# Patient Record
Sex: MALE | Race: WHITE | NOT HISPANIC OR LATINO | Employment: OTHER | URBAN - METROPOLITAN AREA
[De-identification: names, ages, dates, MRNs, and addresses within clinical notes are randomized per-mention and may not be internally consistent; named-entity substitution may affect disease eponyms.]

---

## 2017-02-16 ENCOUNTER — HOSPITAL ENCOUNTER (OUTPATIENT)
Dept: SLEEP MEDICINE | Facility: HOSPITAL | Age: 62
Discharge: HOME | End: 2017-02-16
Attending: INTERNAL MEDICINE | Admitting: INTERNAL MEDICINE

## 2017-02-16 PROCEDURE — G0463 HOSPITAL OUTPT CLINIC VISIT: HCPCS

## 2017-02-16 PROCEDURE — 99213 OFFICE O/P EST LOW 20 MIN: CPT | Performed by: INTERNAL MEDICINE

## 2017-02-19 PROBLEM — Z99.89 OSA ON CPAP: Status: ACTIVE | Noted: 2017-02-19

## 2017-02-19 PROBLEM — G47.33 OSA ON CPAP: Status: ACTIVE | Noted: 2017-02-19

## 2017-02-19 NOTE — PROGRESS NOTES
[facility] Note 2/16/2017      Patient Care Team:  Angle Tucker MD as PCP - General  Angle Tucker MD as PCP - Family Medicine    Chief Complaint:  OTONIEL     Interval History:   The patient was last seen by me in June 2016.  He states he is always tired during the day and becomes exhausted by 7 PM.  He goes to bed between 9:30 and 10 PM and awakens at 5:30 AM.  He awakens once for the bathroom.  [place] Sleepiness Scale is abnormal at 9.    Review of Systems:  Recorded on the Sleep Questionnaire.  Unremarkable except for anxiety.    Social History:  He does not smoke cigarettes.  No alcohol.  No caffeine.    Allergies:  No known medical allergies.     Medication Review:  Reviewed.  He takes Nuvigil 250 mg one half tablet every morning.    Vital Signs:  Height 72 inches and weight 205 and he is overweight with a body mass index of 27-28.    Physical Exam:    Constitutional:  Well developed white male and appears in no apparent distress.  Awake & oriented times 3.  Normal mood with normal recent and remote memory and normal judgement.  Eyes:  Conjunctivae normal.  Oropharynx:  moist mucous membranes without exudate and a large tongue and normal uvula and patent posterior pharyngeal opening and class II MP airway.      Results Review:  DME is [employer] and he uses nasal pillows.  Downloads between 8/20/2016 and 2/15/2017 reveals compliance of only 41%.  Average usage is 5 hours and 41 minutes and average AHI is normal without significant leak.  Average auto CPAP pressure is 9.7 and his auto CPAP is 7-15.       Impression:   Obstructive sleep apnea adequately treated with auto titrating CPAP for compliance and good usage.  He has persistent complaints of hypersomnolence and he takes Nuvigil 250 mg one half or one tab every morning.      Plan:  Good sleep hygiene measures should be maintained and some weight loss may be beneficial.  The patient's worsening hypersomnolence is due to his poor  compliance.  Again, I warned him about taking Nuvigil without adequately treating his OTONIEL.  His hypersomnolence could be improved if his compliance with his CPAP was better.  I encouraged increased CPAP use every night.  I also reviewed the website for [employer].  I will see the patient back in 3-4 months.      Manav Méndez MD  02/19/17  11:02 AM

## 2017-05-22 ENCOUNTER — OFFICE (OUTPATIENT)
Dept: URBAN - METROPOLITAN AREA OTHER 6 | Facility: OTHER | Age: 62
End: 2017-05-22

## 2017-05-22 VITALS
HEIGHT: 72 IN | SYSTOLIC BLOOD PRESSURE: 142 MMHG | WEIGHT: 213 LBS | HEART RATE: 82 BPM | DIASTOLIC BLOOD PRESSURE: 72 MMHG

## 2017-05-22 DIAGNOSIS — R07.89 OTHER CHEST PAIN: ICD-10-CM

## 2017-05-22 DIAGNOSIS — K22.4 DYSKINESIA OF ESOPHAGUS: ICD-10-CM

## 2017-05-22 PROCEDURE — 99213 OFFICE O/P EST LOW 20 MIN: CPT | Performed by: INTERNAL MEDICINE

## 2017-05-25 ENCOUNTER — AMBULATORY SURGICAL CENTER (OUTPATIENT)
Dept: URBAN - METROPOLITAN AREA SURGERY 20 | Facility: SURGERY | Age: 62
End: 2017-05-25

## 2017-05-25 ENCOUNTER — OFFICE (OUTPATIENT)
Dept: URBAN - METROPOLITAN AREA CLINIC 64 | Facility: CLINIC | Age: 62
End: 2017-05-25
Payer: COMMERCIAL

## 2017-05-25 DIAGNOSIS — K22.8 OTHER SPECIFIED DISEASES OF ESOPHAGUS: ICD-10-CM

## 2017-05-25 DIAGNOSIS — K29.70 GASTRITIS, UNSPECIFIED, WITHOUT BLEEDING: ICD-10-CM

## 2017-05-25 DIAGNOSIS — K29.50 UNSPECIFIED CHRONIC GASTRITIS WITHOUT BLEEDING: ICD-10-CM

## 2017-05-25 DIAGNOSIS — R07.89 OTHER CHEST PAIN: ICD-10-CM

## 2017-05-25 DIAGNOSIS — K21.0 GASTRO-ESOPHAGEAL REFLUX DISEASE WITH ESOPHAGITIS: ICD-10-CM

## 2017-05-25 PROCEDURE — 88305 TISSUE EXAM BY PATHOLOGIST: CPT | Performed by: INTERNAL MEDICINE

## 2017-05-25 PROCEDURE — 43239 EGD BIOPSY SINGLE/MULTIPLE: CPT | Performed by: INTERNAL MEDICINE

## 2017-05-26 PROBLEM — K22.8 OTHER SPECIFIED DISEASES OF ESOPHAGUS: Status: ACTIVE | Noted: 2017-05-26

## 2017-05-31 ENCOUNTER — APPOINTMENT (OUTPATIENT)
Dept: SLEEP MEDICINE | Facility: HOSPITAL | Age: 62
End: 2017-05-31

## 2017-07-26 ENCOUNTER — APPOINTMENT (OUTPATIENT)
Dept: SLEEP MEDICINE | Facility: HOSPITAL | Age: 62
End: 2017-07-26

## 2017-08-10 ENCOUNTER — OFFICE VISIT (OUTPATIENT)
Dept: CARDIOLOGY | Facility: CLINIC | Age: 62
End: 2017-08-10

## 2017-08-10 VITALS
HEART RATE: 83 BPM | HEIGHT: 72 IN | BODY MASS INDEX: 28.99 KG/M2 | SYSTOLIC BLOOD PRESSURE: 138 MMHG | DIASTOLIC BLOOD PRESSURE: 80 MMHG | WEIGHT: 214 LBS

## 2017-08-10 DIAGNOSIS — I51.7 LEFT VENTRICULAR HYPERTROPHY: ICD-10-CM

## 2017-08-10 DIAGNOSIS — R94.31 ABNORMAL EKG: ICD-10-CM

## 2017-08-10 DIAGNOSIS — I51.7 LVH (LEFT VENTRICULAR HYPERTROPHY): ICD-10-CM

## 2017-08-10 DIAGNOSIS — Z99.89 OSA ON CPAP: Primary | ICD-10-CM

## 2017-08-10 DIAGNOSIS — R09.89 BRUIT OF RIGHT CAROTID ARTERY: ICD-10-CM

## 2017-08-10 DIAGNOSIS — G47.33 OSA ON CPAP: Primary | ICD-10-CM

## 2017-08-10 DIAGNOSIS — R07.2 PRECORDIAL PAIN: ICD-10-CM

## 2017-08-10 PROCEDURE — 99204 OFFICE O/P NEW MOD 45 MIN: CPT | Performed by: INTERNAL MEDICINE

## 2017-08-10 PROCEDURE — 93000 ELECTROCARDIOGRAM COMPLETE: CPT | Performed by: INTERNAL MEDICINE

## 2017-08-10 NOTE — PROGRESS NOTES
Date of Office Visit: 08/10/2017  Encounter Provider: Shirlene Almeida MD  Place of Service: [facility] CARDIOLOGY  Patient Name: Juan Antonio Amezquita  :1955    Chief complaint  Consult requested by Dr. Turk for evaluation of chest pain.    History of Present Illness  Patient is a pleasant 62-year-old gentleman with history of sleep apnea and had been seen in  for atypical chest pain.  A stress echocardiogram revealed normal systolic function with borderline left ventricular hypertrophy and no ischemia.  He was subsequently found to have sleep apnea for which she was placed on CPAP therapy.  He states that over the past 6 months to year as having intermittent episodes of right sided chest discomfort that occurs with stress or when he is rushed.  Last less than 5 minutes and subsequently resolves it is not radiating.  In May 2017 he had a more severe episode of midsternal chest discomfort that occurred at 9 PM while watching TV it lasted for an hour it was unrelieved with nitroglycerin.  Evaluation in the emergency room including CT angiogram of his chest was unremarkable.  There is some mention of an elevated right hemidiaphragm though again this was not noted on the CT scan.  Pain has not recurred since then.  He states he is fairly active is a  and walks frequently up to 10,000 steps a day.    Past Medical History:   Diagnosis Date   • Abdominal pain    • Anal fissure    • Anxiety    • Chest pain    • Fatigue    • Fluid retention    • Hemorrhoids     resolved 14   • Hypogonadism, testicular    • Intercostal pain    • Left ventricular hypertrophy    • Low testosterone    • OTONIEL (obstructive sleep apnea)    • SOB (shortness of breath)     mild     Past Surgical History:   Procedure Laterality Date   • APPENDECTOMY       Outpatient Medications Prior to Visit   Medication Sig Dispense Refill   • ibuprofen (ADVIL,MOTRIN) 800 MG tablet Take  by mouth.     •  pantoprazole (PROTONIX) 40 MG EC tablet Take 40 mg by mouth Daily.     • Testosterone (ANDROGEL PUMP) 20.25 MG/ACT (1.62%) gel Place  on the skin.     • Multiple Vitamins-Minerals (MULTIVITAMIN MEN PO) Take  by mouth.       No facility-administered medications prior to visit.      Allergies as of 08/10/2017   • (No Known Allergies)     Social History     Social History   • Marital status:      Spouse name: N/A   • Number of children: N/A   • Years of education: N/A     Occupational History   • Not on file.     Social History Main Topics   • Smoking status: Never Smoker   • Smokeless tobacco: Not on file   • Alcohol use No   • Drug use: No   • Sexual activity: Not on file     Other Topics Concern   • Not on file     Social History Narrative     Family History   Problem Relation Age of Onset   • Brain cancer Father    • Melanoma Brother    • Hypertension Mother      Review of Systems   Constitution: Negative for fever, malaise/fatigue, weight gain and weight loss.   HENT: Negative for ear pain, hearing loss, nosebleeds and sore throat.    Eyes: Negative for double vision, pain, vision loss in left eye and vision loss in right eye.   Cardiovascular:        See history of present illness.   Respiratory: Negative for cough, shortness of breath, sleep disturbances due to breathing, snoring and wheezing.    Endocrine: Negative for cold intolerance, heat intolerance and polyuria.   Skin: Negative for itching, poor wound healing and rash.   Musculoskeletal: Positive for joint pain. Negative for joint swelling and myalgias.   Gastrointestinal: Positive for abdominal pain. Negative for diarrhea, hematochezia, nausea and vomiting.   Genitourinary: Negative for hematuria and hesitancy.   Neurological: Negative for numbness, paresthesias and seizures.   Psychiatric/Behavioral: Negative for depression. The patient is not nervous/anxious.      Objective:     Vitals:    08/10/17 0848 08/10/17 0900   BP: 142/80 138/80   BP  "Location: Right arm Left arm   Pulse: 83    Weight: 214 lb (97.1 kg)    Height: 72\" (182.9 cm)      Body mass index is 29.02 kg/(m^2).    Physical Exam   Constitutional: He is oriented to person, place, and time. He appears well-developed and well-nourished.   HENT:   Head: Normocephalic.   Nose: Nose normal.   Mouth/Throat: Oropharynx is clear and moist.   Eyes: Conjunctivae and EOM are normal. Pupils are equal, round, and reactive to light. Right eye exhibits no discharge. No scleral icterus.   Neck: Normal range of motion. Neck supple. No JVD present. No thyromegaly present.   Cardiovascular: Normal rate, regular rhythm, normal heart sounds and intact distal pulses.  Exam reveals no gallop and no friction rub.    No murmur heard.  Pulses:       Carotid pulses are 2+ on the right side with bruit, and 2+ on the left side.       Radial pulses are 2+ on the right side, and 2+ on the left side.        Femoral pulses are 2+ on the right side, and 2+ on the left side.       Popliteal pulses are 2+ on the right side, and 2+ on the left side.        Dorsalis pedis pulses are 2+ on the right side, and 2+ on the left side.        Posterior tibial pulses are 2+ on the right side, and 2+ on the left side.   Pulmonary/Chest: Effort normal and breath sounds normal. No respiratory distress. He has no wheezes. He has no rales.   Abdominal: Soft. Bowel sounds are normal. He exhibits no distension. There is no hepatosplenomegaly. There is no tenderness. There is no rebound.   Musculoskeletal: Normal range of motion. He exhibits no edema or tenderness.   Neurological: He is alert and oriented to person, place, and time.   Skin: Skin is warm and dry. No rash noted. No erythema.   Psychiatric: He has a normal mood and affect. His behavior is normal. Judgment and thought content normal.   Vitals reviewed.    Lab Review:     ECG 12 Lead  Date/Time: 8/10/2017 9:41 AM  Performed by: JUSTICE CISNEROS  Authorized by: JUSTICE CISNEROS   Comparison: " compared with previous ECG   Similar to previous ECG  Rhythm: sinus rhythm  Conduction comments: Nonspecific ST T wave changes  QTc = 428 msec  Clinical impression: abnormal ECG          Assessment:       Diagnosis Plan   1. OTONIEL on autoCPAP     2. Left ventricular hypertrophy     3. Abnormal EKG  Adult Stress Echo With Color & Doppler    ECG 12 Lead   4. LVH (left ventricular hypertrophy)  Adult Stress Echo With Color & Doppler    ECG 12 Lead   5. Bruit of right carotid artery  Duplex Carotid Ultrasound CAR   6. Precordial pain  ECG 12 Lead     Plan:       1.  Chest pain.  He has 2 types of chest pain both with anginal and atypical features.  We'll check a stress echocardiogram to assess for ischemia.  2.  Elevated blood pressure with history of left ventricular hypertrophy.  We'll assess by echocardiography  3.  Obstructive sleep apnea on CPAP therapy  4.  History of borderline elevated elevated glucose, low carbohydrate diet and increase in his excise regimen recommended  5.  Dyslipidemia  6.  Abdominal pain is chronic intermittent abdominal pain with a colonoscopy that was negative a year ago.  He has no melena or bright red blood.  7.  Carotid bruit. Will check a carotid doppler    Juan Antonio Amezquita   Home Medication Instructions YADIRA:    Printed on:08/13/17 2004   Medication Information                      ibuprofen (ADVIL,MOTRIN) 800 MG tablet  Take  by mouth.             pantoprazole (PROTONIX) 40 MG EC tablet  Take 40 mg by mouth Daily.             Testosterone (ANDROGEL PUMP) 20.25 MG/ACT (1.62%) gel  Place  on the skin.               Dictated utilizing Dragon dictation

## 2017-08-13 PROBLEM — R09.89 BRUIT OF RIGHT CAROTID ARTERY: Status: ACTIVE | Noted: 2017-08-13

## 2017-08-13 PROBLEM — I51.7 LVH (LEFT VENTRICULAR HYPERTROPHY): Status: ACTIVE | Noted: 2017-08-13

## 2017-08-13 PROBLEM — R94.31 ABNORMAL EKG: Status: ACTIVE | Noted: 2017-08-13

## 2017-08-13 PROBLEM — R07.2 PRECORDIAL PAIN: Status: ACTIVE | Noted: 2017-08-13

## 2017-08-18 ENCOUNTER — HOSPITAL ENCOUNTER (OUTPATIENT)
Dept: CARDIOLOGY | Facility: HOSPITAL | Age: 62
Discharge: HOME | End: 2017-08-18
Attending: INTERNAL MEDICINE | Admitting: INTERNAL MEDICINE

## 2017-08-18 VITALS
DIASTOLIC BLOOD PRESSURE: 80 MMHG | BODY MASS INDEX: 28.99 KG/M2 | SYSTOLIC BLOOD PRESSURE: 122 MMHG | HEART RATE: 76 BPM | WEIGHT: 214 LBS | HEIGHT: 72 IN | OXYGEN SATURATION: 97 %

## 2017-08-18 DIAGNOSIS — I51.7 LVH (LEFT VENTRICULAR HYPERTROPHY): ICD-10-CM

## 2017-08-18 DIAGNOSIS — R94.31 ABNORMAL EKG: ICD-10-CM

## 2017-08-18 LAB
BH CV ECHO MEAS - ACS: 2.3 CM
BH CV ECHO MEAS - AO MAX PG (FULL): 3.1 MMHG
BH CV ECHO MEAS - AO MAX PG: 6.4 MMHG
BH CV ECHO MEAS - AO ROOT AREA (BSA CORRECTED): 1.4
BH CV ECHO MEAS - AO ROOT AREA: 7.9 CM^2
BH CV ECHO MEAS - AO ROOT DIAM: 3.2 CM
BH CV ECHO MEAS - AO V2 MAX: 126.2 CM/SEC
BH CV ECHO MEAS - BSA(HAYCOCK): 2.2 M^2
BH CV ECHO MEAS - BSA: 2.2 M^2
BH CV ECHO MEAS - BZI_BMI: 29 KILOGRAMS/M^2
BH CV ECHO MEAS - BZI_METRIC_HEIGHT: 182.9 CM
BH CV ECHO MEAS - BZI_METRIC_WEIGHT: 97.1 KG
BH CV ECHO MEAS - CONTRAST EF 4CH: 66.7 ML/M^2
BH CV ECHO MEAS - EDV(MOD-SP4): 78 ML
BH CV ECHO MEAS - EDV(TEICH): 124.3 ML
BH CV ECHO MEAS - EF(CUBED): 82.1 %
BH CV ECHO MEAS - EF(MOD-SP4): 66.7 %
BH CV ECHO MEAS - EF(TEICH): 74.5 %
BH CV ECHO MEAS - ESV(MOD-SP4): 26 ML
BH CV ECHO MEAS - ESV(TEICH): 31.7 ML
BH CV ECHO MEAS - FS: 43.6 %
BH CV ECHO MEAS - IVS/LVPW: 1
BH CV ECHO MEAS - IVSD: 0.88 CM
BH CV ECHO MEAS - LAT PEAK E' VEL: 8 CM/SEC
BH CV ECHO MEAS - LV DIASTOLIC VOL/BSA (35-75): 35.6 ML/M^2
BH CV ECHO MEAS - LV MASS(C)D: 155.6 GRAMS
BH CV ECHO MEAS - LV MASS(C)DI: 71 GRAMS/M^2
BH CV ECHO MEAS - LV MAX PG: 3.3 MMHG
BH CV ECHO MEAS - LV MEAN PG: 1.5 MMHG
BH CV ECHO MEAS - LV SYSTOLIC VOL/BSA (12-30): 11.9 ML/M^2
BH CV ECHO MEAS - LV V1 MAX: 90.2 CM/SEC
BH CV ECHO MEAS - LV V1 MEAN: 55.4 CM/SEC
BH CV ECHO MEAS - LV V1 VTI: 19.3 CM
BH CV ECHO MEAS - LVIDD: 5.1 CM
BH CV ECHO MEAS - LVIDS: 2.9 CM
BH CV ECHO MEAS - LVLD AP4: 6.9 CM
BH CV ECHO MEAS - LVLS AP4: 6.1 CM
BH CV ECHO MEAS - LVPWD: 0.85 CM
BH CV ECHO MEAS - MED PEAK E' VEL: 8 CM/SEC
BH CV ECHO MEAS - MV A DUR: 0.11 SEC
BH CV ECHO MEAS - MV A MAX VEL: 60.6 CM/SEC
BH CV ECHO MEAS - MV DEC SLOPE: 263.5 CM/SEC^2
BH CV ECHO MEAS - MV DEC TIME: 0.23 SEC
BH CV ECHO MEAS - MV E MAX VEL: 63.5 CM/SEC
BH CV ECHO MEAS - MV E/A: 1
BH CV ECHO MEAS - MV P1/2T MAX VEL: 65 CM/SEC
BH CV ECHO MEAS - MV P1/2T: 72.2 MSEC
BH CV ECHO MEAS - MVA P1/2T LCG: 3.4 CM^2
BH CV ECHO MEAS - MVA(P1/2T): 3 CM^2
BH CV ECHO MEAS - PULM A REVS DUR: 0.1 SEC
BH CV ECHO MEAS - PULM A REVS VEL: 35.4 CM/SEC
BH CV ECHO MEAS - PULM DIAS VEL: 42.7 CM/SEC
BH CV ECHO MEAS - PULM S/D: 1.3
BH CV ECHO MEAS - PULM SYS VEL: 53.4 CM/SEC
BH CV ECHO MEAS - RAP SYSTOLE: 3 MMHG
BH CV ECHO MEAS - SI(CUBED): 49.9 ML/M^2
BH CV ECHO MEAS - SI(MOD-SP4): 23.7 ML/M^2
BH CV ECHO MEAS - SI(TEICH): 42.2 ML/M^2
BH CV ECHO MEAS - SV(CUBED): 109.4 ML
BH CV ECHO MEAS - SV(MOD-SP4): 52 ML
BH CV ECHO MEAS - SV(TEICH): 92.6 ML
BH CV STRESS BP STAGE 1: NORMAL
BH CV STRESS BP STAGE 2: NORMAL
BH CV STRESS BP STAGE 3: NORMAL
BH CV STRESS DURATION MIN STAGE 1: 3
BH CV STRESS DURATION MIN STAGE 2: 3
BH CV STRESS DURATION MIN STAGE 3: 2
BH CV STRESS DURATION SEC STAGE 1: 0
BH CV STRESS DURATION SEC STAGE 2: 0
BH CV STRESS DURATION SEC STAGE 3: 30
BH CV STRESS ECHO POST STRESS EJECTION FRACTION EF: 74 %
BH CV STRESS GRADE STAGE 1: 10
BH CV STRESS GRADE STAGE 2: 12
BH CV STRESS GRADE STAGE 3: 14
BH CV STRESS HR STAGE 1: 102
BH CV STRESS HR STAGE 2: 124
BH CV STRESS HR STAGE 3: 145
BH CV STRESS METS STAGE 1: 5
BH CV STRESS METS STAGE 2: 7.5
BH CV STRESS METS STAGE 3: 10
BH CV STRESS PROTOCOL 1: NORMAL
BH CV STRESS SPEED STAGE 1: 1.7
BH CV STRESS SPEED STAGE 2: 2.5
BH CV STRESS SPEED STAGE 3: 3.4
BH CV STRESS STAGE 1: 1
BH CV STRESS STAGE 2: 2
BH CV STRESS STAGE 3: 3
E/E' RATIO: 8
LEFT ATRIUM VOLUME INDEX: 16 ML/M2
LEFT ATRIUM VOLUME: 36 CM3
LV EF 2D ECHO EST: 66 %
MAXIMAL PREDICTED HEART RATE: 157 BPM
PERCENT MAX PREDICTED HR: 92.36 %
STRESS BASELINE BP: NORMAL MMHG
STRESS BASELINE HR: 76 BPM
STRESS PERCENT HR: 109 %
STRESS POST ESTIMATED WORKLOAD: 9 METS
STRESS POST EXERCISE DUR MIN: 8 MIN
STRESS POST EXERCISE DUR SEC: 30 SEC
STRESS POST PEAK BP: NORMAL MMHG
STRESS POST PEAK HR: 145 BPM
STRESS TARGET HR: 133 BPM

## 2017-08-18 PROCEDURE — 25010000002 PERFLUTREN (DEFINITY) 8.476 MG IN SODIUM CHLORIDE 10 ML INJECTION: Performed by: INTERNAL MEDICINE

## 2017-08-18 PROCEDURE — C8928 TTE W OR W/O FOL W/CON,STRES: HCPCS

## 2017-08-18 PROCEDURE — 93018 CV STRESS TEST I&R ONLY: CPT | Performed by: INTERNAL MEDICINE

## 2017-08-18 PROCEDURE — 93350 STRESS TTE ONLY: CPT | Performed by: INTERNAL MEDICINE

## 2017-08-18 PROCEDURE — 93325 DOPPLER ECHO COLOR FLOW MAPG: CPT

## 2017-08-18 PROCEDURE — 93016 CV STRESS TEST SUPVJ ONLY: CPT | Performed by: INTERNAL MEDICINE

## 2017-08-18 PROCEDURE — 93017 CV STRESS TEST TRACING ONLY: CPT

## 2017-08-18 PROCEDURE — 93325 DOPPLER ECHO COLOR FLOW MAPG: CPT | Performed by: INTERNAL MEDICINE

## 2017-08-18 PROCEDURE — 93352 ADMIN ECG CONTRAST AGENT: CPT | Performed by: INTERNAL MEDICINE

## 2017-08-18 PROCEDURE — 93320 DOPPLER ECHO COMPLETE: CPT | Performed by: INTERNAL MEDICINE

## 2017-08-18 PROCEDURE — 93320 DOPPLER ECHO COMPLETE: CPT

## 2017-08-18 RX ADMIN — PERFLUTREN 3 ML: 6.52 INJECTION, SUSPENSION INTRAVENOUS at 09:28

## 2017-08-24 ENCOUNTER — HOSPITAL ENCOUNTER (OUTPATIENT)
Dept: SLEEP MEDICINE | Facility: HOSPITAL | Age: 62
Discharge: HOME | End: 2017-08-24
Admitting: INTERNAL MEDICINE

## 2017-08-24 PROCEDURE — G0463 HOSPITAL OUTPT CLINIC VISIT: HCPCS

## 2017-08-24 PROCEDURE — 99213 OFFICE O/P EST LOW 20 MIN: CPT | Performed by: INTERNAL MEDICINE

## 2017-08-25 ENCOUNTER — TELEPHONE (OUTPATIENT)
Dept: CARDIOLOGY | Facility: CLINIC | Age: 62
End: 2017-08-25

## 2017-08-25 NOTE — TELEPHONE ENCOUNTER
I called patient at cell phone and got voicemail.  Left message to call back.  Left message on cell phone that test okay to call back on Monday. mehul

## 2017-08-26 NOTE — PROGRESS NOTES
[facility] Note       Patient Care Team:  Angle Tucker MD as PCP - Family Medicine  Manav Méndez MD as Consulting Physician (Sleep Medicine)    Chief Complaint:  OTONIEL     Interval History:   The patient was last seen by me in February of this year.  He has persistent complaints of hypersomnolence.  He states he is better presently.  He goes to bed at 10 PM awakens at 5:30 AM.  He awakens once for the bathroom.  His [place] Sleepiness Scale is still abnormal at 12.  The patient stopped Nuvigil since I saw him last time.    Review of Systems:  Recorded on the Sleep Questionnaire.  Unremarkable .    Social History:  He does not smoke cigarettes.  He denies alcohol or caffeine.    Allergies:  No known medical allergies.     Medication Review:  Reviewed.      Vital Signs:  Height 72 inches and weight 205 and he is overweight with a body mass index of 28.    Physical Exam:    Constitutional:  Well developed white male and appears in no apparent distress.  Awake & oriented times 3.  Normal mood with normal recent and remote memory and normal judgement.  Eyes:  Conjunctivae normal.  Oropharynx:  moist mucous membranes without exudate and a large tongue and normal uvula and patent posterior pharyngeal opening and class II MP airway.      Results Review:  DME is  and he uses a nasal mask.  Downloads between February 25 and August 23, 2017 compliances 73% and average usage is 5 hours and 54 minutes and average AHI is normal without leak and average auto CPAP pressure is 9.4 and his auto CPAP is 7-15.  Since June 14, the patient's compliance is greater than 95%.       Impression:   Obstructive sleep apnea adequately treated with auto titrating CPAP with good compliance and usage and some persistent complaints of hypersomnolence.      Plan:  Good sleep hygiene measures should be maintained.  Weight loss would be beneficial in this patient who is obese by BMI.  The patient is benefiting from  the treatment being provided.     The patient will continue auto CPAP as he is doing.  He should use it every night.  A new prescription will be sent to his DME for all needed supplies.    The patient will call for any problems and will follow up in one year.      Manav Méndez MD  08/26/17  1:43 PM

## 2017-08-28 DIAGNOSIS — Z13.6 ENCOUNTER FOR SCREENING FOR VASCULAR DISEASE: ICD-10-CM

## 2017-08-28 DIAGNOSIS — R09.89 CAROTID BRUIT, UNSPECIFIED LATERALITY: Primary | ICD-10-CM

## 2017-08-28 NOTE — TELEPHONE ENCOUNTER
I talked to patient regarding test results.  He did not have typical symptoms on the treadmill.  He had vague: And has not likely musculoskeletal.  He will pay attention to this in the upcoming months.  We'll check a screening vascular study as insurance would not cover carotid Doppler for carotid bruit.     Please arrange a vascular screening.  mehul

## 2017-08-29 NOTE — TELEPHONE ENCOUNTER
We do not have anymore openings for the vascular screening this year, and do not have the schedule for next year yet. Would you like me to inform the patient that they can get it done at the hospital for 75.00?

## 2017-10-09 ENCOUNTER — OFFICE (OUTPATIENT)
Dept: URBAN - METROPOLITAN AREA OTHER 6 | Facility: OTHER | Age: 62
End: 2017-10-09
Payer: COMMERCIAL

## 2017-10-09 VITALS
HEART RATE: 83 BPM | DIASTOLIC BLOOD PRESSURE: 60 MMHG | WEIGHT: 209 LBS | HEIGHT: 72 IN | SYSTOLIC BLOOD PRESSURE: 100 MMHG

## 2017-10-09 DIAGNOSIS — R07.89 OTHER CHEST PAIN: ICD-10-CM

## 2017-10-09 DIAGNOSIS — K22.4 DYSKINESIA OF ESOPHAGUS: ICD-10-CM

## 2017-10-09 DIAGNOSIS — K29.50 UNSPECIFIED CHRONIC GASTRITIS WITHOUT BLEEDING: ICD-10-CM

## 2017-10-09 PROCEDURE — 99212 OFFICE O/P EST SF 10 MIN: CPT | Performed by: INTERNAL MEDICINE

## 2017-11-12 ENCOUNTER — HOSPITAL ENCOUNTER (OUTPATIENT)
Dept: CARDIOLOGY | Facility: HOSPITAL | Age: 62
Discharge: HOME | End: 2017-11-12
Attending: INTERNAL MEDICINE

## 2017-11-12 DIAGNOSIS — Z13.6 ENCOUNTER FOR SCREENING FOR VASCULAR DISEASE: ICD-10-CM

## 2017-11-12 DIAGNOSIS — R09.89 CAROTID BRUIT, UNSPECIFIED LATERALITY: ICD-10-CM

## 2017-11-14 LAB
BH CV XLRA MEAS - PAD LEFT ARM: 121 MMHG
BH CV XLRA MEAS - PAD LEFT LEG PT: 138 MMHG
BH CV XLRA MEAS - PAD RIGHT ARM: 99 MMHG
BH CV XLRA MEAS - PAD RIGHT LEG PT: 151 MMHG
BH CV XLRA MEAS - PROX AO DIAM: 1.7 CM
BH CV XLRA MEAS LEFT ICA/CCA RATIO: 0.8
BH CV XLRA MEAS LEFT MID CCA PSV: NORMAL CM/SEC
BH CV XLRA MEAS LEFT MID ICA PSV: NORMAL CM/SEC
BH CV XLRA MEAS LEFT PROX ECA PSV: 89 CM/SEC
BH CV XLRA MEAS RIGHT ICA/CCA RATIO: 0.77
BH CV XLRA MEAS RIGHT MID CCA PSV: NORMAL CM/SEC
BH CV XLRA MEAS RIGHT MID ICA PSV: NORMAL CM/SEC
BH CV XLRA MEAS RIGHT PROX ECA PSV: 121 CM/SEC

## 2018-03-15 ENCOUNTER — OFFICE VISIT (OUTPATIENT)
Dept: CARDIOLOGY | Facility: CLINIC | Age: 63
End: 2018-03-15

## 2018-03-15 VITALS
WEIGHT: 213 LBS | SYSTOLIC BLOOD PRESSURE: 126 MMHG | BODY MASS INDEX: 28.85 KG/M2 | DIASTOLIC BLOOD PRESSURE: 80 MMHG | HEIGHT: 72 IN | HEART RATE: 83 BPM

## 2018-03-15 DIAGNOSIS — R94.31 ABNORMAL EKG: ICD-10-CM

## 2018-03-15 DIAGNOSIS — I51.7 LEFT VENTRICULAR HYPERTROPHY: ICD-10-CM

## 2018-03-15 DIAGNOSIS — R07.2 PRECORDIAL PAIN: Primary | ICD-10-CM

## 2018-03-15 PROCEDURE — 93000 ELECTROCARDIOGRAM COMPLETE: CPT | Performed by: INTERNAL MEDICINE

## 2018-03-15 PROCEDURE — 99213 OFFICE O/P EST LOW 20 MIN: CPT | Performed by: INTERNAL MEDICINE

## 2018-03-15 NOTE — PROGRESS NOTES
Date of Office Visit: 03/15/2018  Encounter Provider: Shirlene Almeida MD  Place of Service: [facility] CARDIOLOGY  Patient Name: Juan Antonio Amezquita  :1955    Chief complaint  Follow-up of chest pain.    History of Present Illness  Patient is a 62-year-old gentleman with history of sleep apnea and atypical chest pain.  He was seen and 2017 for recurrent chest discomfort in fact had 2 types.  With atypical and anginal features.  He had an echocardiogram that revealed normal systolic function with no significant valvular heart disease.  He had a no ischemia on the stress test.  He was treated further for elevated blood pressure with lifestyle changes and is here for follow-up.  In addition he had a vascular screening study that was normal.    Since last visit he states his blood pressure he believes has improved.  He is walking 10,000 steps a day.  He denies any exertional chest pain palpitations syncope near syncope.  He has had some right parasternal chest discomfort that's positional.  There was a mention on prior records of possible right diaphragmatic paralysis but this was not noted on subsequent imaging studies.    Past Medical History:   Diagnosis Date   • Abdominal pain    • Anal fissure    • Anxiety    • Chest pain    • Fatigue    • Fluid retention    • Hemorrhoids     resolved 14   • Hypogonadism, testicular    • Intercostal pain    • Left ventricular hypertrophy    • Low testosterone    • OTONIEL (obstructive sleep apnea)    • SOB (shortness of breath)     mild     Past Surgical History:   Procedure Laterality Date   • APPENDECTOMY         Current Outpatient Prescriptions:   •  ibuprofen (ADVIL,MOTRIN) 800 MG tablet, Take  by mouth., Disp: , Rfl:   •  Testosterone (ANDROGEL PUMP) 20.25 MG/ACT (1.62%) gel, Place  on the skin., Disp: , Rfl:     (Not in a hospital admission)    Allergies as of 03/15/2018   • (No Known Allergies)     Social History     Social History   •  "Marital status:      Spouse name: N/A   • Number of children: N/A   • Years of education: N/A     Occupational History   • Not on file.     Social History Main Topics   • Smoking status: Never Smoker   • Smokeless tobacco: Not on file   • Alcohol use No   • Drug use: No   • Sexual activity: Not on file     Other Topics Concern   • Not on file     Social History Narrative   • No narrative on file     Family History   Problem Relation Age of Onset   • Brain cancer Father    • Melanoma Brother    • Hypertension Mother      Review of Systems   Constitution: Positive for malaise/fatigue. Negative for fever, weight gain and weight loss.   HENT: Negative for ear pain, hearing loss, nosebleeds and sore throat.    Eyes: Negative for double vision, pain, vision loss in left eye and vision loss in right eye.   Cardiovascular:        See history of present illness.   Respiratory: Negative for cough, shortness of breath, sleep disturbances due to breathing, snoring and wheezing.    Endocrine: Negative for cold intolerance, heat intolerance and polyuria.   Skin: Negative for itching, poor wound healing and rash.   Musculoskeletal: Negative for joint pain, joint swelling and myalgias.   Gastrointestinal: Negative for abdominal pain, diarrhea, hematochezia, nausea and vomiting.   Genitourinary: Negative for hematuria and hesitancy.   Neurological: Negative for numbness, paresthesias and seizures.   Psychiatric/Behavioral: Negative for depression. The patient is nervous/anxious.         Objective:     Vitals:    03/15/18 0825   BP: 126/80   Pulse: 83   Weight: 96.6 kg (213 lb)   Height: 182.9 cm (72\")     Body mass index is 28.89 kg/m².    Physical Exam   Constitutional: He is oriented to person, place, and time. He appears well-developed and well-nourished. No distress.   HENT:   Head: Normocephalic.   Eyes: Conjunctivae are normal. Pupils are equal, round, and reactive to light. No scleral icterus.   Neck: Normal carotid " pulses, no hepatojugular reflux and no JVD present. Carotid bruit is not present. No tracheal deviation, no edema and no erythema present. No thyromegaly present.   Cardiovascular: Normal rate, regular rhythm, S1 normal, S2 normal, normal heart sounds and intact distal pulses.   No extrasystoles are present. PMI is not displaced.  Exam reveals no gallop, no distant heart sounds and no friction rub.    No murmur heard.  Pulses:       Carotid pulses are 2+ on the right side, and 2+ on the left side.       Radial pulses are 2+ on the right side, and 2+ on the left side.        Femoral pulses are 2+ on the right side, and 2+ on the left side.       Dorsalis pedis pulses are 2+ on the right side, and 2+ on the left side.        Posterior tibial pulses are 2+ on the right side, and 2+ on the left side.   Pulmonary/Chest: Effort normal and breath sounds normal. No respiratory distress. He has no decreased breath sounds. He has no wheezes. He has no rhonchi. He has no rales. He exhibits no tenderness.   Abdominal: Soft. Bowel sounds are normal. He exhibits no distension and no mass. There is no hepatosplenomegaly. There is no tenderness. There is no rebound and no guarding.   Musculoskeletal: He exhibits no edema, tenderness or deformity.   Neurological: He is alert and oriented to person, place, and time.   Skin: Skin is warm and dry. No rash noted. He is not diaphoretic. No cyanosis or erythema. No pallor. Nails show no clubbing.   Psychiatric: He has a normal mood and affect. His speech is normal and behavior is normal. Judgment and thought content normal.     Lab Review:     ECG 12 Lead  Date/Time: 3/15/2018 9:12 AM  Performed by: JUSTICE CISNEROS  Authorized by: JUSTICE CISNEROS   Comparison: compared with previous ECG   Similar to previous ECG  Rhythm: sinus rhythm  Clinical impression: normal ECG          Assessment:       Diagnosis Plan   1. Precordial pain  ECG 12 Lead   2. Left ventricular hypertrophy     3. Abnormal EKG        Plan:         1.  Chest pain.  No evidence of ischemia on recent testing.  We'll observe for now infected symptoms of improved.  Residual right-sided chest pain sounds more musculoskeletal  2.  Hypertension improved with medical therapy last all changes.  We'll observe for now  3.  Obstructive sleep apnea, on CPAP therapy  4.  Questionable right diaphragmatic paralysis.  Remains asymptomatic.  We'll also follow-up with primary care physician with whom he is to establish care.     Juan Antonio Amezquita   Home Medication Instructions YADIRA:    Printed on:03/22/18 8594   Medication Information                      ibuprofen (ADVIL,MOTRIN) 800 MG tablet  Take  by mouth.             Testosterone (ANDROGEL PUMP) 20.25 MG/ACT (1.62%) gel  Place  on the skin.                 No orders of the defined types were placed in this encounter.      Dictated utilizing Dragon dictation

## 2018-04-11 ENCOUNTER — OFFICE (OUTPATIENT)
Dept: URBAN - METROPOLITAN AREA OTHER 6 | Facility: OTHER | Age: 63
End: 2018-04-11

## 2018-04-11 VITALS
HEART RATE: 96 BPM | WEIGHT: 213 LBS | SYSTOLIC BLOOD PRESSURE: 140 MMHG | DIASTOLIC BLOOD PRESSURE: 80 MMHG | HEIGHT: 72 IN

## 2018-04-11 DIAGNOSIS — R07.89 OTHER CHEST PAIN: ICD-10-CM

## 2018-04-11 DIAGNOSIS — K22.4 DYSKINESIA OF ESOPHAGUS: ICD-10-CM

## 2018-04-11 PROCEDURE — 99212 OFFICE O/P EST SF 10 MIN: CPT | Performed by: INTERNAL MEDICINE

## 2018-04-11 RX ORDER — AMITRIPTYLINE HYDROCHLORIDE 25 MG/1
25 TABLET, FILM COATED ORAL
Qty: 90 | Refills: 5 | Status: ACTIVE
Start: 2017-06-08

## 2018-04-19 ENCOUNTER — OFFICE VISIT (OUTPATIENT)
Dept: SLEEP MEDICINE | Facility: HOSPITAL | Age: 63
End: 2018-04-19
Attending: INTERNAL MEDICINE

## 2018-04-19 DIAGNOSIS — G47.33 OSA ON CPAP: Primary | ICD-10-CM

## 2018-04-19 DIAGNOSIS — Z99.89 OSA ON CPAP: Primary | ICD-10-CM

## 2018-04-19 DIAGNOSIS — G47.14 HYPERSOMNIA DUE TO MEDICAL CONDITION: ICD-10-CM

## 2018-04-19 PROCEDURE — 99213 OFFICE O/P EST LOW 20 MIN: CPT | Performed by: INTERNAL MEDICINE

## 2018-04-19 PROCEDURE — G0463 HOSPITAL OUTPT CLINIC VISIT: HCPCS

## 2018-04-19 NOTE — PROGRESS NOTES
Follow Up [facility] Note       Patient Care Team:  Angle Tucker MD as PCP - Family Medicine  Christopher Faith MD as Consulting Physician (Gastroenterology)  Manav Méndez MD as Consulting Physician (Sleep Medicine)    Chief Complaint:  OTONIEL     Interval History:   The patient was last seen by me in August 2017.  He is stable and unchanged.  He goes to bed at 9:30 PM and awakens at 5:30 AM.  He awakens once for the bathroom.  [place] Sleepiness Scale is abnormal at 16.    Since I last saw him, he states his gastroenterologist added amitriptyline for his GI tract.    Review of Systems:  Recorded on the Sleep Questionnaire.  Unremarkable .    Social History:  Caffeine minimal  Social History     Social History   • Marital status:      Social History Main Topics   • Smoking status: Never Smoker   • Alcohol use No   • Drug use: No     Other Topics Concern   • Not on file       Allergies:  Review of patient's allergies indicates no known allergies.     Medication Review:  Reviewed.      Vital Signs:  Height 72 inches, weight 205 pounds and BMI overweight at 28.    Physical Exam:    Constitutional:  Well developed white male and appears in no apparent distress.  Awake & oriented times 3.  Normal mood with normal recent and remote memory and normal judgement.  Eyes:  Conjunctivae normal.  Oropharynx:  moist mucous membranes without exudate and a large tongue and normal uvula and patent posterior pharyngeal opening and class II MP airway      Results Review:  DME is  and he uses a nasal mask.  Downloads between October 21 and April 18, 2018 compliances.  Average usage is 5 hours and 52 minutes.  Average AHI is normal without leak.  Average AutoCPAP pressure is 9.1 and his auto CPAP is 7-15.       Impression:   Obstructive sleep apnea adequately treated with auto titrating CPAP with good compliance and usage and some persistent complaints of hypersomnolence      Plan:  Good sleep hygiene  measures should be maintained.  Some weight loss would be beneficial in this patient who is overweight by BMI.  The patient is benefiting from the treatment being provided.     The patient will continue auto CPAP.  His usage has improved since January of this year.  A new prescription will be sent for all needed supplies.    The patient will call for any problems and will follow up in one year.      Manav Méndez MD  Sleep Medicine  04/24/18  9:50 AM

## 2018-04-24 PROBLEM — G47.14 HYPERSOMNIA DUE TO MEDICAL CONDITION: Status: ACTIVE | Noted: 2018-04-24

## 2018-11-01 ENCOUNTER — OFFICE VISIT (OUTPATIENT)
Dept: CARDIOLOGY | Facility: CLINIC | Age: 63
End: 2018-11-01

## 2018-11-01 VITALS
HEIGHT: 72 IN | BODY MASS INDEX: 29.26 KG/M2 | DIASTOLIC BLOOD PRESSURE: 82 MMHG | WEIGHT: 216 LBS | HEART RATE: 86 BPM | SYSTOLIC BLOOD PRESSURE: 128 MMHG

## 2018-11-01 DIAGNOSIS — I51.7 LEFT VENTRICULAR HYPERTROPHY: ICD-10-CM

## 2018-11-01 DIAGNOSIS — Z87.898 HISTORY OF CHEST PAIN: Primary | ICD-10-CM

## 2018-11-01 DIAGNOSIS — G47.33 OSA ON CPAP: ICD-10-CM

## 2018-11-01 DIAGNOSIS — Z99.89 OSA ON CPAP: ICD-10-CM

## 2018-11-01 DIAGNOSIS — R94.31 ABNORMAL EKG: ICD-10-CM

## 2018-11-01 PROCEDURE — 99214 OFFICE O/P EST MOD 30 MIN: CPT | Performed by: NURSE PRACTITIONER

## 2018-11-01 PROCEDURE — 93000 ELECTROCARDIOGRAM COMPLETE: CPT | Performed by: NURSE PRACTITIONER

## 2018-11-01 RX ORDER — RANITIDINE 300 MG/1
1 TABLET ORAL DAILY
COMMUNITY
Start: 2018-10-23

## 2018-11-01 NOTE — PROGRESS NOTES
Date of Office Visit: 2018  Encounter Provider: Karen Miles, CARMELLA, APRN  Place of Service: [facility] CARDIOLOGY  Patient Name: Juan Antonio Amezquita  :1955      Subjective:     Chief Complaint:  Follow-up, history of LVH, history of chest pain, sleep apnea.    History of Present Illness:  Juan Antonio Amezquita is a 63 y.o. male patient of Dr. Almeida.  This is my first time seeing this patient in the office and I reviewed his records.    Patient has a history of hypertension, LVH, chest pain, abnormal EKG, sleep apnea.    Patient was seen in 2017 for recurrent chest discomfort, and it was noted that he had 2 types of chest pain.  One was atypical, one had anginal features.  He had an echocardiogram that revealed normal systolic function with no significant valvular heart disease.  He had a stress test that did not show ischemia.  He was treated further for elevated blood pressure with lifestyle changes.  He also had a vascular study done which showed no significant stenosis of carotid arteries.    Patient was last seen in office by Dr. Almeida 3/22/18.  At this point it was noted that his blood pressure had improved.  He was walking 10,000 steps a day.  He denied any exertional chest pain, palpitations, syncope, near-syncope.  He did have some right parasternal chest discomfort that was positional in nature.  There is mention of prior records of possible right diaphragmatic paralysis but this had not been noted on subsequent imaging studies.  Patient was instructed to follow-up in 6 months or sooner if needed.    Patient presents to office today for follow-up appointment.  A short reports he has been doing well overall since last visit.  He reports that he has some chronic fatigue.  He has a history of sleep apnea and reports that he uses his CPAP machine at least 90% of the time.  He has seen sleep medicine within the last year and reports that he sees him yearly.  He denies any chest  pain, shortness of breath, shortness of breath with exertion, palpitations, racing heartbeat sensation, lower extremity edema, syncope, near-syncope, falls, abnormal bleeding.  Unfortunately patient retired since last visit and reports that he has not been doing his daily walking anymore.  He stays active around the house and denies any exertional symptoms with his routine activities.  He reports that she was under some increased stress due to recently moving (he sold his house to his daughter and persistent had a patio home), and he is getting ready to [place] of [place].  However he is planning on keeping his patio home here in [place] and has grandkids here and travels here frequently so he wants to keep his providers here.  He will schedule a follow-up appointment with Dr. Almeida in 6 months.  He does not check blood pressure regularly outside the office but he reports that he was recently around 120/80 at primary care provider's office.  He does have electronic blood pressure cuff and reports she will start checking it a couple of times a week to make sure that it is staying 130/80 or less.  Patient reports he has been having anxiety recently.  He denies any SI.  We discussed following up with this further with his primary care provider.  I did offer to place a psychiatry referral, however patient will like to discuss this with primary care provider first.  I also discussed with patient that he could call his insurance company to find out the name of some talk therapists in his network.  Patient is going to follow-up with primary care provider for further evaluation of fatigue and anxiety-- he has an appointment scheduled within the next one to 2 weeks.  Patient was also encouraged to get back into walking routine and start out slowly with this and increase as tolerated.  He'll notify the office if he has any issues or problems with exertion.      Past Medical History:   Diagnosis Date   •  Abdominal pain    • Anal fissure    • Anxiety    • Chest pain    • Fatigue    • Fluid retention    • Hemorrhoids     resolved 05/29/14   • Hypogonadism, testicular    • Intercostal pain    • Left ventricular hypertrophy    • Low testosterone    • OTONIEL (obstructive sleep apnea)    • Precordial pain    • SOB (shortness of breath)     mild     Past Surgical History:   Procedure Laterality Date   • APPENDECTOMY       Outpatient Medications Prior to Visit   Medication Sig Dispense Refill   • ibuprofen (ADVIL,MOTRIN) 800 MG tablet Take  by mouth.     • Testosterone (ANDROGEL PUMP) 20.25 MG/ACT (1.62%) gel Place  on the skin.       No facility-administered medications prior to visit.        Allergies as of 11/01/2018   • (No Known Allergies)     Social History     Social History   • Marital status:      Spouse name: N/A   • Number of children: N/A   • Years of education: N/A     Occupational History   • Not on file.     Social History Main Topics   • Smoking status: Never Smoker   • Smokeless tobacco: Not on file   • Alcohol use No   • Drug use: No   • Sexual activity: Not on file     Other Topics Concern   • Not on file     Social History Narrative   • No narrative on file     Family History   Problem Relation Age of Onset   • Brain cancer Father    • Melanoma Brother    • Hypertension Mother        Review of Systems   Constitution: Positive for malaise/fatigue.   Cardiovascular: Negative for chest pain, dyspnea on exertion, leg swelling, palpitations and syncope.   Respiratory:        ON CPAP; followed by sleep medicine yearly   Hematologic/Lymphatic: Negative for bleeding problem.   Musculoskeletal: Negative for falls.   Gastrointestinal: Negative for jaundice.   Neurological: Negative for seizures.   Psychiatric/Behavioral: Negative for altered mental status and suicidal ideas. The patient is nervous/anxious.           Objective:     Vitals:    11/01/18 1017   BP: 128/82   BP Location: Right arm   Pulse: 86  "  Weight: 98 kg (216 lb)   Height: 182.9 cm (72\")     Body mass index is 29.29 kg/m².      PHYSICAL EXAM:  Physical Exam   Constitutional: He is oriented to person, place, and time. He appears well-developed and well-nourished. No distress.   HENT:   Head: Normocephalic and atraumatic.   Eyes: Pupils are equal, round, and reactive to light.   Wears glasses.   Neck: Neck supple. No JVD present. Carotid bruit is not present. No tracheal deviation present.   Cardiovascular: Normal rate, regular rhythm, normal heart sounds and intact distal pulses.  Exam reveals no gallop and no friction rub.    No murmur heard.  Pulses:       Radial pulses are 2+ on the right side, and 2+ on the left side.        Posterior tibial pulses are 2+ on the right side, and 2+ on the left side.   Pulmonary/Chest: Effort normal and breath sounds normal. No respiratory distress. He has no wheezes. He has no rales.   Abdominal: Soft. Bowel sounds are normal. He exhibits no distension. There is no tenderness. There is no rebound and no guarding.   Musculoskeletal: He exhibits no edema, tenderness or deformity.   Neurological: He is alert and oriented to person, place, and time.   Skin: Skin is warm and dry. No rash noted. He is not diaphoretic. No erythema.   Psychiatric: He has a normal mood and affect. His behavior is normal. Judgment normal.         ECG 12 Lead  Date/Time: 11/1/2018 1:01 PM  Performed by: SCOTTIE BARKER  Authorized by: SCOTTIE BARKER   Comparison: compared with previous ECG from 3/15/2018  Similar to previous ECG  Rhythm: sinus rhythm  Rate: normal  BPM: 86  Clinical impression: normal ECG            Assessment:       Diagnosis Plan   1. History of chest pain     2. OTONIEL on autoCPAP     3. Left ventricular hypertrophy     4. Abnormal EKG         Plan:     1. History of chest pain: Patient denies any recent chest pain, shortness of breath, shortness of breath with exertion, palpitations, or racing heartbeat sensation.  " Unfortunately he has not been as physically active since last visit as he previously was.  Patient was encouraged to slowly get back into some walking and to notify the office if he develops any new or recurrent symptoms or other problems/concerns.  2. Obstructive sleep apnea: On CPAP machine.  Sees sleep medicine yearly.  3. Hx of LVH: Patient had echocardiogram done in 2017 showing normal left ventricular cavity size and wall thickness.  Left ventricular systolic function was normal at 66%.  All left ventricular wall segments contracted normally.  Left ventricular diastolic function was normal.  Right ventricular cavity size, wall thickness, systolic function, and septal motion were noted.  No significant valvular issues were seen.  Patient's blood pressure in the office today is well controlled at 128/82.  He reports he was recently around 120/80 and primary care provider's office as well.  He also reports that he does have an electronic blood pressure cuff that he continues to spot check it and was asked to notify office if it is greater than 130/80.  4. History of abnormal EKG: EKG in office today shows normal sinus rhythm without significant changes from previous.  5. Anxiety and fatigue: Patient to follow-up with primary care provider within the next week or so as scheduled for further evaluation and treatment of anxiety and fatigue.  Patient denies any SI.    Follow-up in 6 months or follow-up sooner if needed for any new or worsening symptoms or other problems/concerns.           Your medication list          Accurate as of 11/1/18  1:08 PM. If you have any questions, ask your nurse or doctor.               CONTINUE taking these medications      Instructions Last Dose Given Next Dose Due   ANDROGEL PUMP 20.25 MG/ACT (1.62%) gel  Generic drug:  Testosterone      Place  on the skin.       ibuprofen 800 MG tablet  Commonly known as:  ADVIL,MOTRIN      Take  by mouth.       raNITIdine 300 MG tablet  Commonly  known as:  ZANTAC      Take 1 tablet by mouth Daily.                     Karen Miles, CARMELLA, APRN  11/01/2018       Dictated utilizing Dragon dictation

## 2018-12-05 ENCOUNTER — OFFICE (OUTPATIENT)
Dept: URBAN - METROPOLITAN AREA CLINIC 66 | Facility: CLINIC | Age: 63
End: 2018-12-05

## 2018-12-05 VITALS
WEIGHT: 214 LBS | HEIGHT: 72 IN | DIASTOLIC BLOOD PRESSURE: 70 MMHG | SYSTOLIC BLOOD PRESSURE: 112 MMHG | HEART RATE: 80 BPM

## 2018-12-05 DIAGNOSIS — K22.4 DYSKINESIA OF ESOPHAGUS: ICD-10-CM

## 2018-12-05 DIAGNOSIS — R14.0 ABDOMINAL DISTENSION (GASEOUS): ICD-10-CM

## 2018-12-05 DIAGNOSIS — R14.2 ERUCTATION: ICD-10-CM

## 2018-12-05 PROCEDURE — 99214 OFFICE O/P EST MOD 30 MIN: CPT | Performed by: INTERNAL MEDICINE

## 2018-12-05 RX ORDER — AMITRIPTYLINE HYDROCHLORIDE 25 MG/1
25 TABLET, FILM COATED ORAL
Qty: 90 | Refills: 5 | Status: ACTIVE
Start: 2017-06-08

## 2018-12-05 RX ORDER — SIMETHICONE 180 MG
540 CAPSULE ORAL
Qty: 120 | Refills: 5 | Status: ACTIVE
Start: 2018-12-05

## 2019-03-13 ENCOUNTER — OFFICE VISIT (OUTPATIENT)
Dept: SLEEP MEDICINE | Facility: HOSPITAL | Age: 64
End: 2019-03-13
Attending: INTERNAL MEDICINE

## 2019-03-13 VITALS — BODY MASS INDEX: 29.53 KG/M2 | HEIGHT: 72 IN | WEIGHT: 218 LBS

## 2019-03-13 DIAGNOSIS — Z99.89 OSA ON CPAP: Primary | ICD-10-CM

## 2019-03-13 DIAGNOSIS — G47.33 OSA ON CPAP: Primary | ICD-10-CM

## 2019-03-13 PROCEDURE — G0463 HOSPITAL OUTPT CLINIC VISIT: HCPCS

## 2019-03-13 PROCEDURE — 99213 OFFICE O/P EST LOW 20 MIN: CPT | Performed by: INTERNAL MEDICINE

## 2019-03-13 NOTE — PROGRESS NOTES
"Follow Up [facility] Note     Chief Complaint:  OTONIEL     Primary Care Physician: Angle Tucker MD    Interval History:   I last saw the patient in April 2018.  The patient did obtain a new device and is here for 3-month follow-up.  He states he is improved.  He goes to bed at 10:30 PM and awakens at 7:30 AM.  He will use the restroom once during the nighttime.  [place] Sleepiness Scale borderline normal at 7.    Review of Systems:  Recorded on the Sleep Questionnaire.  Unremarkable     Social History:    Social History     Socioeconomic History   • Marital status:      Spouse name: Not on file   • Number of children: Not on file   • Years of education: Not on file   • Highest education level: Not on file   Tobacco Use   • Smoking status: Never Smoker   Substance and Sexual Activity   • Alcohol use: No   • Drug use: No       Allergies:  Patient has no known allergies.     Medication Review:  Reviewed.      Vital Signs:    Vitals:    03/13/19 0842   Weight: 98.9 kg (218 lb)   Height: 182.9 cm (72\")     Body mass index is 29.57 kg/m².    Physical Exam:    Constitutional:  Well developed 63 y.o. male that appears in no apparent distress.  Awake & oriented times 3.  Normal mood with normal recent and remote memory and normal judgement.  Eyes:  Conjunctivae normal.  Oropharynx:  moist mucous membranes without exudate and a large tongue and normal uvula and patent posterior pharyngeal opening and class II MP airway     Results Review:  DME is  and he uses a nasal mask.  Downloads between 12/13 and 3/12/2019 compliance 91%.  Average usage is 6 hours and 41 minutes.  Average AHI is normal without leak.  Average AutoCPAP pressure is 8.2 and his auto CPAP is 7-15.       Impression:   Obstructive sleep apnea adequately treated with auto CPAP with good compliance and usage and no complaints of hypersomnolence.    Plan:  Good sleep hygiene measures should be maintained.  Weight loss would be beneficial " in this patient who is nearly obese by BMI.  The patient is benefiting from the treatment being provided.     The patient will continue auto CPAP.    The patient will call for any problems and will follow up in 1 year.      Manav Méndez MD  Sleep Medicine  03/13/19  9:02 AM

## 2020-03-11 ENCOUNTER — APPOINTMENT (OUTPATIENT)
Dept: SLEEP MEDICINE | Facility: HOSPITAL | Age: 65
End: 2020-03-11

## 2020-05-21 ENCOUNTER — APPOINTMENT (OUTPATIENT)
Dept: SLEEP MEDICINE | Facility: HOSPITAL | Age: 65
End: 2020-05-21

## 2020-06-18 ENCOUNTER — APPOINTMENT (OUTPATIENT)
Dept: SLEEP MEDICINE | Facility: HOSPITAL | Age: 65
End: 2020-06-18

## 2021-03-22 ENCOUNTER — BULK ORDERING (OUTPATIENT)
Dept: CASE MANAGEMENT | Facility: OTHER | Age: 66
End: 2021-03-22

## 2021-03-22 DIAGNOSIS — Z23 IMMUNIZATION DUE: ICD-10-CM

## 2021-08-18 ENCOUNTER — DASHBOARD ENCOUNTERS (OUTPATIENT)
Age: 66
End: 2021-08-18

## 2021-09-24 ENCOUNTER — OFFICE VISIT (OUTPATIENT)
Dept: RURAL CLINIC 8 | Facility: CLINIC | Age: 66
End: 2021-09-24